# Patient Record
Sex: MALE | Race: AMERICAN INDIAN OR ALASKA NATIVE
[De-identification: names, ages, dates, MRNs, and addresses within clinical notes are randomized per-mention and may not be internally consistent; named-entity substitution may affect disease eponyms.]

---

## 2017-05-10 ENCOUNTER — HOSPITAL ENCOUNTER (OUTPATIENT)
Dept: HOSPITAL 5 - MRI | Age: 47
Discharge: HOME | End: 2017-05-10
Attending: ORTHOPAEDIC SURGERY
Payer: COMMERCIAL

## 2017-05-10 DIAGNOSIS — M47.27: ICD-10-CM

## 2017-05-10 DIAGNOSIS — M51.17: Primary | ICD-10-CM

## 2017-05-10 PROCEDURE — 72148 MRI LUMBAR SPINE W/O DYE: CPT

## 2017-05-10 NOTE — MAGNETIC RESONANCE REPORT
MRI of the lumbar spine without contrast.



History: Lumbosacral radiculopathy.



Procedure: Sagittal T1-weighted, T2-weighted, inversion recovery 

images, and axial T1 and T2-weighted images views in this study.



Findings: The L1-2, L2-3, and L3-4 levels are unremarkable.



At L4-5, there is loss of T2 signal within the discs, but no disc bulge 

or herniation is seen.



At L5-S1, there is a left posterolateral disc herniation with moderate 

effacement of the anterolateral aspect of the thecal sac and 

impingement on the left S1 nerve root. There is mild narrowing of the 

disc space.



There no significant bony abnormalities. Alignment is normal. The conus 

is normal.



Impression: Left posterolateral disc herniation at L5-S1 with 

associated compressive changes as described above.

## 2017-06-12 LAB
ALBUMIN SERPL-MCNC: 4.4 G/DL (ref 3.9–5)
ALBUMIN/GLOB SERPL: 1.5 %
ALP SERPL-CCNC: 45 UNITS/L (ref 35–129)
ALT SERPL-CCNC: 20 UNITS/L (ref 7–56)
ANION GAP SERPL CALC-SCNC: 18 MMOL/L
BASOPHILS NFR BLD AUTO: 0.3 % (ref 0–1.8)
BILIRUB SERPL-MCNC: 0.5 MG/DL (ref 0.1–1.2)
BUN SERPL-MCNC: 19 MG/DL (ref 9–20)
BUN/CREAT SERPL: 19 %
CALCIUM SERPL-MCNC: 9.9 MG/DL (ref 8.4–10.2)
CHLORIDE SERPL-SCNC: 102.5 MMOL/L (ref 98–107)
CO2 SERPL-SCNC: 24 MMOL/L (ref 22–30)
EOSINOPHIL NFR BLD AUTO: 2.5 % (ref 0–4.3)
GLUCOSE SERPL-MCNC: 121 MG/DL (ref 75–100)
HCT VFR BLD CALC: 40 % (ref 35.5–45.6)
HGB BLD-MCNC: 13.2 GM/DL (ref 11.8–15.2)
MCH RBC QN AUTO: 27 PG (ref 28–32)
MCHC RBC AUTO-ENTMCNC: 33 % (ref 32–34)
MCV RBC AUTO: 83 FL (ref 84–94)
PLATELET # BLD: 231 K/MM3 (ref 140–440)
POTASSIUM SERPL-SCNC: 4 MMOL/L (ref 3.6–5)
PROT SERPL-MCNC: 7.3 G/DL (ref 6.3–8.2)
RBC # BLD AUTO: 4.81 M/MM3 (ref 3.65–5.03)
SODIUM SERPL-SCNC: 140 MMOL/L (ref 137–145)
WBC # BLD AUTO: 4.5 K/MM3 (ref 4.5–11)

## 2017-06-12 NOTE — ANESTHESIA CONSULTATION
Anesthesia Consult and Med Hx


Date of service: 06/14/17





- Airway


Anesthetic Teeth Evaluation: Good, Chipped (top front)


ROM Head & Neck: Adequate


Mental/Hyoid Distance: Adequate


Mallampati Class: Class II


Intubation Access Assessment: Probably Good





- Pulmonary Exam


CTA: Yes





- Cardiac Exam


Cardiac Exam: RRR





- Pre-Operative Health Status


ASA Pre-Surgery Classification: ASA1, ASA2


Proposed Anesthetic Plan: General





- Pulmonary


Hx Smoking: No


Hx Sleep Apnea: No (BIANCA PRE SCREEN LOW RISK)





- Cardiovascular System


Hx Hypertension: No (elevated cholesterol)





- Central Nervous System


Hx Back Pain: Yes (TO LEFT LEG PAIN)





- Other Systems


Hx Cancer: No





- Additional Comments


Anesthesia Medical History Comments: glaucoma

## 2017-06-13 NOTE — HISTORY AND PHYSICAL REPORT
History of Present Illness


Date of examination: 06/12/17


Date of admission: 





6/14/17


Chief complaint: 


Low back pain, left leg pain.





History of present illness: 





46-year-old man with complaints of low back pain with pain radiating the left 

leg, activity related and is numbness to the dorsal lateral aspect of the left 

foot.  Treated with physical therapy program, anti-inflammatories and pain 

management.  Symptoms persisted and MR scan confirmed a herniated disc at L5-S1

, degenerative disc L4-L5 and L5-S1.  The admitted for laminectomy and 

discectomy.





Past History


Past Medical History: hyperlipidemia





Medications and Allergies


 Allergies











Allergy/AdvReac Type Severity Reaction Status Date / Time


 


Sulfa (Sulfonamide Allergy  Hives Verified 06/08/17 14:55





Antibiotics)     











 Home Medications











 Medication  Instructions  Recorded  Confirmed  Last Taken  Type


 


Rosuvastatin Calcium [Crestor] 40 mg PO QHS 06/08/17 06/08/17 Unknown History


 


traMADol [Ultram] 50 mg PO Q6HR PRN 06/08/17 06/08/17 Unknown History


 


HYDROcodone/APAP 5-325 [Nyssa 1 each PO Q6HR PRN 06/12/17 06/12/17 Unknown 

History





5/325]     











Active Meds: 


Active Medications





Famotidine (Pepcid)  20 mg PO PREOP NR


   Stop: 06/14/17 23:59


Lactated Ringer's (Lactated Ringers)  1,000 mls @ 75 mls/hr IV AS DIRECT ANA ROSA


   Stop: 06/14/17 23:59


Cefazolin Sodium (Ancef/Sterile Water 2 Gm/20 Ml)  2 gm in 20 mls @ 80 mls/hr 

IV PREOP NR


   PRN Reason: Protocol


   Stop: 06/14/17 15:00


Midazolam HCl (Versed)  2 mg IV PREOP NR


   Stop: 06/14/17 23:59











Review of Systems


All systems: negative





Exam





- Constitutional


Vitals: 


 











Temp Pulse Resp BP Pulse Ox


 


 97.5 F L  76   20   102/60    


 


 06/12/17 09:30  06/12/17 09:30  06/12/17 09:30  06/12/17 09:30   











General appearance: Present: no acute distress, well-nourished





- EENT


Eyes: Present: PERRL


ENT: hearing intact, clear oral mucosa





- Neck


Neck: Present: supple, normal ROM





- Respiratory


Respiratory effort: normal


Respiratory: bilateral: CTA





- Cardiovascular


Heart Sounds: Present: S1 & S2.  Absent: rub, click





- Extremities


Extremities: pulses symmetrical, No edema, abnormal (Lumbar spine with reversal 

lordosis, moderate generalized tenderness, no deformity.  Forward flexion to 

knee, extension 20 rotations bending 20.  Straight-leg raising positive the 

left at 6070 degrees right 80.  Angela test negative, numbness along the left 

posterolateral calf extending into the foot.  Weakness left their on a and EHL, 

and the tibial grade 5, symmetrical bilateral.  Reflexes diminished left ankle 

otherwise within normal limits.  Tone and coordination unremarkable, pulse 

palpated unremarkable.)


Peripheral Pulses: within normal limits





- Abdominal


General gastrointestinal: Present: soft, non-tender, non-distended, normal 

bowel sounds


Male genitourinary: Present: normal





- Integumentary


Integumentary: Present: clear, warm, dry





- Musculoskeletal


Musculoskeletal: gait normal, strength equal bilaterally





- Psychiatric


Psychiatric: appropriate mood/affect, intact judgment & insight





- Neurologic


Neurologic: CNII-XII intact, moves all extremities





Results





- Labs


CBC & Chem 7: 


 06/12/17 09:30





 06/12/17 09:30





Assessment and Plan





- Patient Problems


(1) Lumbar disc herniation with radiculopathy


Status: Acute   


Plan to address problem: 


Laminectomy, discectomy L5-S1,?  L4-L5.  Procedure, complications and Lachman 

discussed with.

## 2017-06-14 ENCOUNTER — HOSPITAL ENCOUNTER (OUTPATIENT)
Dept: HOSPITAL 5 - OR | Age: 47
Setting detail: OBSERVATION
LOS: 1 days | Discharge: HOME | End: 2017-06-15
Attending: ORTHOPAEDIC SURGERY | Admitting: ORTHOPAEDIC SURGERY
Payer: COMMERCIAL

## 2017-06-14 VITALS — DIASTOLIC BLOOD PRESSURE: 53 MMHG | SYSTOLIC BLOOD PRESSURE: 100 MMHG

## 2017-06-14 DIAGNOSIS — E78.5: ICD-10-CM

## 2017-06-14 DIAGNOSIS — M51.16: Primary | ICD-10-CM

## 2017-06-14 PROCEDURE — A4649 SURGICAL SUPPLIES: HCPCS

## 2017-06-14 PROCEDURE — 97161 PT EVAL LOW COMPLEX 20 MIN: CPT

## 2017-06-14 PROCEDURE — 86900 BLOOD TYPING SEROLOGIC ABO: CPT

## 2017-06-14 PROCEDURE — 88304 TISSUE EXAM BY PATHOLOGIST: CPT

## 2017-06-14 PROCEDURE — 96366 THER/PROPH/DIAG IV INF ADDON: CPT

## 2017-06-14 PROCEDURE — 63030 LAMOT DCMPRN NRV RT 1 LMBR: CPT

## 2017-06-14 PROCEDURE — 80053 COMPREHEN METABOLIC PANEL: CPT

## 2017-06-14 PROCEDURE — 85025 COMPLETE CBC W/AUTO DIFF WBC: CPT

## 2017-06-14 PROCEDURE — G0378 HOSPITAL OBSERVATION PER HR: HCPCS

## 2017-06-14 PROCEDURE — 36415 COLL VENOUS BLD VENIPUNCTURE: CPT

## 2017-06-14 PROCEDURE — 86901 BLOOD TYPING SEROLOGIC RH(D): CPT

## 2017-06-14 PROCEDURE — 96365 THER/PROPH/DIAG IV INF INIT: CPT

## 2017-06-14 PROCEDURE — 72020 X-RAY EXAM OF SPINE 1 VIEW: CPT

## 2017-06-14 PROCEDURE — 86850 RBC ANTIBODY SCREEN: CPT

## 2017-06-14 RX ADMIN — CEFAZOLIN SCH MLS/HR: 330 INJECTION, POWDER, FOR SOLUTION INTRAMUSCULAR; INTRAVENOUS at 18:07

## 2017-06-14 NOTE — PROCEDURE NOTE
Date of procedure: 06/14/17


Pre-op diagnosis: Herniated disc L5-S1


Post-op diagnosis: same


Procedure: 





Laminectomy/ discetomy L5-s1


Anesthesia: GETA


Surgeon: ROSSI LORENZ


Estimated blood loss: minimal


Pathology: list (disc)


Specimen disposition: to lab


Condition: stable


Disposition: PACU

## 2017-06-14 NOTE — OPERATIVE REPORT
PREOPERATIVE DIAGNOSIS:  Herniated disk at L5-S1 with left radiculopathy.



POSTOPERATIVE DIAGNOSIS:  Herniated disk at L5-S1 with left radiculopathy.



OPERATIVE PROCEDURE:  Lumbar laminectomy with diskectomy, L5-S1.



SURGEON:  Katheryn Luther MD



ASSISTANT:  Cleopatra Flores CSA.



ANESTHESIA:  General.



BLOOD LOSS:  Less than 50 mL.



PROCEDURE IN DETAIL:  The patient was taken to surgery suite, satisfactory

analgesia obtained with general anesthetic.  He was positioned using the Frederic

frame.  Bony prominences satisfactorily padded.  Lumbar region prepped with

ChloraPrep, satisfactorily draped in the standard fashion.



The correct patient, procedure and sites were confirmed.



Midline incision was then made centering over the spinous process of L5 through

S2.  Incision was deepened through thickness of skin and subcutaneous. 

Thoracolumbar fascia was incised and is elevated subperiosteally over the left

where the patient is symptomatic.  Fluoroscopy was carried out and the L5-S1

space was confirmed.



The ligament of flavum structures then elevated from the posterior arch of L5. 

Cotton pad_ was inserted to protect thedural structures and the laminotomy was 
then

completed.  The L5-S1 nerve root was identified which was retracted to the

midline, lateral recess was packed with cottonoids.  Epidural veins were

cauterized and the disk was visualized, which appeared protruding into the

thecal sac.  Incision was made into the posterior longitudinal ligament and the

disk space was then tied.  Disk was then evacuated using pituitary forceps, disk

space gently corrected with the ring curette to remove any loose fragments and

decompression was completed.  Decompression appeared adequate.  Wound was then

irrigated.  Neural foramina was explored and no retained fragments were

identified.



The laminotomy defect was covered with Gelfoam and 40 mg of Decadron was

administered into the epidural space.  Wound was then closed in layers in the

standard fashion using 0 Vicryl, 2-0 Vicryl and the standard skin closure. 

Paravertebral musculatures and thoracolumbar fascia was then infiltrated with 20

mL of 0.25% Marcaine plain and 30 mg of Toradol and 4 mg of morphine proposed

postop pain control.  Sterile dressings placed.  The patient was transferred to

recovery room having tolerated the procedure well and at the completion of

procedure, counts were accurate.



The patient will be discharged when his antibiotics doses are completed.



HOMEGOING INSTRUCTIONS:  Sedentary activity level.  Wound care.  Follow up at

the office in 7 to 10 days.





DD: 06/14/2017 11:19

DT: 06/14/2017 11:58

JOB# [id number]  [id number]

VENANCIO/ARTEM MAYS

## 2017-06-14 NOTE — DISCHARGE SUMMARY
Providers





- Providers


Date of Admission: 





6/14/17


Date of discharge: 06/15/17


Attending physician: 


ROSSI LORENZ





Primary care physician: 


TANNER WATT








Hospitalization


Reason for admission: Herniated disc L5-S1


Condition: Stable


Procedures: 





lumbar laminectomy, discectomy L5-S1 left


Hospital course: 





uneventful, no complications


Disposition: MO-01 TO HOME OR SELFCARE





- Discharge Diagnoses


(1) Lumbar disc herniation with radiculopathy


Status: Acute   





Core Measure Documentation





- Palliative Care


Palliative Care/ Comfort Measures: Not Applicable





- Core Measures


Any of the following diagnoses?: none





Exam





- Constitutional


Vitals: 


 











Temp Pulse Resp BP Pulse Ox


 


 98.3 F   60   16   109/69   100 


 


 06/14/17 09:15  06/14/17 09:15  06/14/17 09:15  06/14/17 09:15  06/14/17 09:15














Plan


Activity: no driving until cleared by PCP, other


Weight Bearing Status: Full Weight Bearing


Diet: regular


Wound: per your surgeon's advice


Follow up with: 


TANNER WATT MD [Primary Care Provider] - 7 Days


ROSSI LORENZ MD [Staff Physician] - 7 Days

## 2017-06-15 RX ADMIN — CEFAZOLIN SCH MLS/HR: 330 INJECTION, POWDER, FOR SOLUTION INTRAMUSCULAR; INTRAVENOUS at 01:27

## 2017-06-15 NOTE — XRAY REPORT
X-RAY LUMBAR SPINE THREE VIEWS: 06/14/17



CLINICAL: Fluoroscopic images at time of lumbar laminectomy.



FINDINGS: A lateral  is centered at L5-S1.  Metal instruments 

marked level on a subsequent image.

## 2017-06-15 NOTE — PROGRESS NOTE
Assessment and Plan


Alert orientated in nad 


OOB neuro intact


pain ok





DC today








Subjective


Date of service: 06/15/17





Objective


Vital signs: 


 Vital Signs - 12hr











  06/14/17





  23:50


 


Temperature 97.6 F


 


Pulse Rate [ 50 L





From Monitor] 


 


Respiratory 16





Rate 


 


Blood Pressure 100/53





[Left Arm] 


 


O2 Sat by Pulse 99





Oximetry 














- Labs


CBC & BMP: 


 06/12/17 09:30





 06/12/17 09:30

## 2020-02-05 ENCOUNTER — HOSPITAL ENCOUNTER (OUTPATIENT)
Dept: HOSPITAL 5 - XRAY | Age: 50
Discharge: HOME | End: 2020-02-05
Attending: INTERNAL MEDICINE
Payer: COMMERCIAL

## 2020-02-05 DIAGNOSIS — Z00.00: Primary | ICD-10-CM

## 2020-02-05 DIAGNOSIS — M25.561: ICD-10-CM

## 2020-02-05 DIAGNOSIS — M51.37: ICD-10-CM

## 2020-02-05 DIAGNOSIS — M25.562: ICD-10-CM

## 2020-02-05 LAB
ALBUMIN SERPL-MCNC: 4.8 G/DL (ref 3.9–5)
ALT SERPL-CCNC: 17 UNITS/L (ref 7–56)
BUN SERPL-MCNC: 17 MG/DL (ref 9–20)
BUN/CREAT SERPL: 19 %
CALCIUM SERPL-MCNC: 10.2 MG/DL (ref 8.4–10.2)
HCT VFR BLD CALC: 43.8 % (ref 35.5–45.6)
HDLC SERPL-MCNC: 76 MG/DL (ref 40–59)
HEMOLYSIS INDEX: 2
HGB BLD-MCNC: 14.7 GM/DL (ref 11.8–15.2)
MCHC RBC AUTO-ENTMCNC: 34 % (ref 32–34)
MCV RBC AUTO: 83 FL (ref 84–94)
PLATELET # BLD: 237 K/MM3 (ref 140–440)
RBC # BLD AUTO: 5.27 M/MM3 (ref 3.65–5.03)

## 2020-02-05 PROCEDURE — 85027 COMPLETE CBC AUTOMATED: CPT

## 2020-02-05 PROCEDURE — 83036 HEMOGLOBIN GLYCOSYLATED A1C: CPT

## 2020-02-05 PROCEDURE — 84153 ASSAY OF PSA TOTAL: CPT

## 2020-02-05 PROCEDURE — 71046 X-RAY EXAM CHEST 2 VIEWS: CPT

## 2020-02-05 PROCEDURE — 80061 LIPID PANEL: CPT

## 2020-02-05 PROCEDURE — 80053 COMPREHEN METABOLIC PANEL: CPT

## 2020-02-05 PROCEDURE — 82306 VITAMIN D 25 HYDROXY: CPT

## 2020-02-05 PROCEDURE — 36415 COLL VENOUS BLD VENIPUNCTURE: CPT

## 2020-02-05 PROCEDURE — 72110 X-RAY EXAM L-2 SPINE 4/>VWS: CPT

## 2020-02-05 PROCEDURE — 85652 RBC SED RATE AUTOMATED: CPT

## 2020-02-05 NOTE — XRAY REPORT
CHEST 2 VIEWS 



INDICATION:  Routine physical.



COMPARISON:  None available



FINDINGS:

Support devices: None.



Heart: Within normal limits. 

Lungs/pleura: No acute air space or interstitial disease.  No pneumothorax.



Additional findings: None.



IMPRESSION:

Normal chest x-ray



LUMBOSACRAL SPINE 5 VIEWS



INDICATION:  DUE TO BACK PAIN.



COMPARISON: None.



IMPRESSION:  Normal alignment.  Mild discogenic DJD is identified at L3-4. Moderate discogenic DJD is
 identified at L5-S1. The facet joints are unremarkable. No significant neural foraminal narrowing is
 noted on the oblique images.  No acute osseous or soft tissue abnormality.  



BILATERAL KNEES 3 VIEWS



INDICATION:  Bilateral knee pain. 



COMPARISON: None.



IMPRESSION:  No acute osseous or soft tissue abnormality.    No significant DJD.



Signer Name: Ezequiel Almaraz Jr, MD 

Signed: 2/5/2020 8:57 AM

 Workstation Name: [id number]

## 2020-02-05 NOTE — XRAY REPORT
CHEST 2 VIEWS 



INDICATION:  Routine physical.



COMPARISON:  None available



FINDINGS:

Support devices: None.



Heart: Within normal limits. 

Lungs/pleura: No acute air space or interstitial disease.  No pneumothorax.



Additional findings: None.



IMPRESSION:

Normal chest x-ray



LUMBOSACRAL SPINE 5 VIEWS



INDICATION:  DUE TO BACK PAIN.



COMPARISON: None.



IMPRESSION:  Normal alignment.  Mild discogenic DJD is identified at L3-4. Moderate discogenic DJD is
 identified at L5-S1. The facet joints are unremarkable. No significant neural foraminal narrowing is
 noted on the oblique images.  No acute osseous or soft tissue abnormality.  



BILATERAL KNEES 3 VIEWS



INDICATION:  Bilateral knee pain. 



COMPARISON: None.



IMPRESSION:  No acute osseous or soft tissue abnormality.    No significant DJD.



Signer Name: Ezequiel Almaraz Jr, MD 

Signed: 2/5/2020 8:57 AM

 Workstation Name: [id number]

## 2020-02-05 NOTE — XRAY REPORT
CHEST 2 VIEWS 



INDICATION:  Routine physical.



COMPARISON:  None available



FINDINGS:

Support devices: None.



Heart: Within normal limits. 

Lungs/pleura: No acute air space or interstitial disease.  No pneumothorax.



Additional findings: None.



IMPRESSION:

Normal chest x-ray



LUMBOSACRAL SPINE 5 VIEWS



INDICATION:  DUE TO BACK PAIN.



COMPARISON: None.



IMPRESSION:  Normal alignment.  Mild discogenic DJD is identified at L3-4. Moderate discogenic DJD is
 identified at L5-S1. The facet joints are unremarkable. No significant neural foraminal narrowing is
 noted on the oblique images.  No acute osseous or soft tissue abnormality.  



BILATERAL KNEES 3 VIEWS



INDICATION:  Bilateral knee pain. 



COMPARISON: None.



IMPRESSION:  No acute osseous or soft tissue abnormality.    No significant DJD.



Signer Name: Ezequiel Almaraz Jr, MD 

Signed: 2/5/2020 8:57 AM

 Workstation Name: [id number]

## 2020-02-08 LAB — VITAMIN D2 SERPL-MCNC: <4 NG/ML

## 2020-11-02 ENCOUNTER — HOSPITAL ENCOUNTER (OUTPATIENT)
Dept: HOSPITAL 5 - MRI | Age: 50
Discharge: HOME | End: 2020-11-02
Attending: INTERNAL MEDICINE
Payer: COMMERCIAL

## 2020-11-02 DIAGNOSIS — G44.52: Primary | ICD-10-CM

## 2020-11-02 DIAGNOSIS — M79.644: ICD-10-CM

## 2020-11-02 DIAGNOSIS — R11.0: ICD-10-CM

## 2020-11-02 DIAGNOSIS — M79.645: ICD-10-CM

## 2020-11-02 PROCEDURE — 70553 MRI BRAIN STEM W/O & W/DYE: CPT

## 2020-11-02 PROCEDURE — A9577 INJ MULTIHANCE: HCPCS

## 2020-11-02 PROCEDURE — 73140 X-RAY EXAM OF FINGER(S): CPT

## 2020-11-02 NOTE — MAGNETIC RESONANCE REPORT
MRI BRAIN WITHOUT AND WITH CONTRAST



INDICATION / CLINICAL INFORMATION:

PERSISTENT HEADACHES, WITH NAUSEA.



TECHNIQUE: 

Multiplanar, multisequence MR images of the brain were obtained. The patient received 15 mL IV ProHan
ce.



COMPARISON: 

None available.



FINDINGS:



BRAIN / INTRACRANIAL CONTENTS: No acute ischemia, acute hemorrhage, mass effect, midline shift, or hy
drocephalus.  No chronic infarct or significant atrophy. No significant demyelinating changes. No abn
ormal enhancement.



CRANIOCERVICAL JUNCTION: No significant abnormality.

VASCULAR FLOW-VOIDS: No significant abnormality.



ORBITS: No significant abnormality of visualized orbits.

SINUSES / MASTOIDS: No significant abnormality of visualized sinuses and mastoid air cells.



ADDITIONAL FINDINGS: None. 



IMPRESSION:

1. No acute or concerning findings. No findings to explain the patient's symptoms. 



Signer Name: Stanton Ahuja MD 

Signed: 11/2/2020 1:06 PM

Workstation Name: [id number]

## 2020-11-02 NOTE — XRAY REPORT
XR finger(s) 2+V BILAT



INDICATION / CLINICAL INFORMATION:

PAIN IN FINGER.



COMPARISON: 

None available.



FINDINGS:



No acute fracture.  Normal alignment.  Joint spaces are preserved. No destructive osseous lesion or s
uspicious periosteal reaction.



Impression:

1.No significant osseous abnormality.



Signer Name: Jordan Miguel MD 

Signed: 11/2/2020 3:14 PM

Workstation Name: [email or web address]

## 2021-03-24 ENCOUNTER — HOSPITAL ENCOUNTER (OUTPATIENT)
Dept: HOSPITAL 5 - XRAY | Age: 51
Discharge: HOME | End: 2021-03-24
Attending: INTERNAL MEDICINE
Payer: COMMERCIAL

## 2021-03-24 ENCOUNTER — HOSPITAL ENCOUNTER (OUTPATIENT)
Dept: HOSPITAL 5 - COVVAC | Age: 51
Discharge: HOME | End: 2021-03-24
Attending: INTERNAL MEDICINE
Payer: COMMERCIAL

## 2021-03-24 DIAGNOSIS — R05: Primary | ICD-10-CM

## 2021-03-24 DIAGNOSIS — Z23: Primary | ICD-10-CM

## 2021-03-24 LAB
ALBUMIN SERPL-MCNC: 4.4 G/DL (ref 3.9–5)
ALT SERPL-CCNC: 18 UNITS/L (ref 7–56)
BASOPHILS # (AUTO): 0 K/MM3 (ref 0–0.1)
BASOPHILS NFR BLD AUTO: 0.3 % (ref 0–1.8)
BUN SERPL-MCNC: 17 MG/DL (ref 9–20)
BUN/CREAT SERPL: 17 %
CALCIUM SERPL-MCNC: 10.1 MG/DL (ref 8.4–10.2)
EOSINOPHIL # BLD AUTO: 0.1 K/MM3 (ref 0–0.4)
EOSINOPHIL NFR BLD AUTO: 2 % (ref 0–4.3)
HCT VFR BLD CALC: 42.7 % (ref 35.5–45.6)
HDLC SERPL-MCNC: 59 MG/DL (ref 40–59)
HEMOLYSIS INDEX: 7
HGB BLD-MCNC: 14.4 GM/DL (ref 11.8–15.2)
LYMPHOCYTES # BLD AUTO: 1.8 K/MM3 (ref 1.2–5.4)
LYMPHOCYTES NFR BLD AUTO: 41.4 % (ref 13.4–35)
MCHC RBC AUTO-ENTMCNC: 34 % (ref 32–34)
MCV RBC AUTO: 84 FL (ref 84–94)
MONOCYTES # (AUTO): 0.4 K/MM3 (ref 0–0.8)
MONOCYTES % (AUTO): 8.4 % (ref 0–7.3)
PLATELET # BLD: 230 K/MM3 (ref 140–440)
RBC # BLD AUTO: 5.06 M/MM3 (ref 3.65–5.03)

## 2021-03-24 PROCEDURE — 0001A: CPT

## 2021-03-24 PROCEDURE — 83036 HEMOGLOBIN GLYCOSYLATED A1C: CPT

## 2021-03-24 PROCEDURE — 84153 ASSAY OF PSA TOTAL: CPT

## 2021-03-24 PROCEDURE — 85025 COMPLETE CBC W/AUTO DIFF WBC: CPT

## 2021-03-24 PROCEDURE — 36415 COLL VENOUS BLD VENIPUNCTURE: CPT

## 2021-03-24 PROCEDURE — 80053 COMPREHEN METABOLIC PANEL: CPT

## 2021-03-24 PROCEDURE — 82306 VITAMIN D 25 HYDROXY: CPT

## 2021-03-24 PROCEDURE — 84145 PROCALCITONIN (PCT): CPT

## 2021-03-24 PROCEDURE — 80061 LIPID PANEL: CPT

## 2021-03-24 PROCEDURE — 71046 X-RAY EXAM CHEST 2 VIEWS: CPT

## 2021-03-24 NOTE — XRAY REPORT
CHEST 2 VIEWS 



INDICATION:  COUGH.



COMPARISON:  2/5/2020



FINDINGS:

Support devices: None.



Heart: Within normal limits. 

Lungs/pleura: No acute air space or interstitial disease.  No pneumothorax.



Additional findings: None.



IMPRESSION:

No acute findings.



Signer Name: Ezequiel Almaraz Jr, MD 

Signed: 3/24/2021 10:45 AM

Workstation Name: [id number]

## 2022-01-01 NOTE — ADMIT CRITERIA FORM
Admission Criteria Documentation: 





            AMBULATORY SURGERY  EXCEPTION CRITERIA 





Ambulatory Surgery Exception Criteria


                                                                               (

Place 'X' for any and all applicable criteria): 





Surgery or procedure performed on ambulatory basis may require inpatient stay 

for[A] ANY ONE of the following(1)(2)(3)(4)(5)(6)(7)(8)(9):


[X] I.   A preoperative situation, condition, or finding that warrants 

inpatient stay as indicated by ANY  ONE of the following:


   [X]   a)   Inpatient care needed because of severity of a disease or 

condition rather than the surgery (eg, 


              severe cardiac or respiratory disease, severe infection) (15) (16

) (17) (18)


   []   b)   Emergent procedure (eg, angioplasty for acute ischemia)(19)


   []   c)   Complex surgical approach or situation as indicated by ANY  ONE of 

the following(3):


        []   i)    Open approach needed instead of usual endoscopic, 

transcatheter, or other less invasive procedure


        []   ii)   Difficult approach because of previous operation


        []   iii)  Airway monitoring required after open neck procedures(20)(21)


        []   iv)  Large mass requiring unusually extensive dissection


        []   v)  Additional complicating feature requiring inpatient care (eg, 

drain management)(22(23):


   []   d)   Major surgery in a pt with high anesthetic risk as indicated by 

ANY  ONE of the following (2)(3)(5)(7)(8):


        []   i)     ASA risk class III or higher (severe systemic disease 

impairing function) [D]


        []   ii)    Advanced age (eg, older than 85 years)(14)(24)             

                   


        []   iii)   Symptomatic heart failure(25)


        []   iv)   Symptomatic asthma or COPD(8)(21)


        []    v)   Morbid obesity with hemodynamic or respiratory problems(20)(

21)(26)(27)


        []   vi)   Obstructive sleep apnea(20)(21)


        []   vii)   Former premature infants who are younger than 60 weeks


        []   viii)  High risk for severe postoperative abnormalities (eg, 

severe postoperative hypocalcemia 


                    after parathyroidectomy for severe hyperparathyroidism)(27)(

28)


        []   ix)   Unstable angina(25) 


   []   e)  Drug-related risk requiring inpatient stay as indicated by ANY  ONE

  of the following(5)(10)(14)(32)(33)


        []   i)     Procedure requires discontinuing drugs or other therapy (eg

, antiarrhythmic    medication,   


                    antiseizure medication), which necessitates inpatient 

observation or treatment.(18)(31)


        []   ii)    Major surgery and high risk drug use as indicated by ANY  

ONE   of the following:


             []     1)    Active abuse of cocaine or similar drug      


             []     2)    Monoamine oxidase inhibitor use


             []     3)    Other drug identified as posing risk


   []   f)   Inadequate outpatient care situation as indicated by ANY  ONE  of 

the following(5)(10)(14)(32)(33)


        []   i)   Patient lives remote from medical facility and procedure has 

urgent complication potential, 


                  and temporary nearby residence cannot be arranged


        []  ii)   Patient will have postprocedure incapacitation and inadequate 

assistance at home, or alternative level of care cannot be arranged.


        []  iii)   Patient will have long general anesthesia or procedure side 

effect resolution time, and 


                  competent person to stay with patient on first postoperative 

night at home or alternative  level of care cannot be arranged.


        []iv)    Other inadequate outpatient situation that cannot be handled 

by other means


[] II.  A perioperative event, condition, or finding that warrants inpatient 

stay as indicated by ANY  ONE of the following (1)(2)(3):        


   []   a)   Inadequate physiologic recovery: cardiovascular,  respiratory, or 

hemodynamic status not normal or near preoperative baseline(18)                

                                                                               

          


   []   b)   Hemodynamic instability


   []   c)   Patient not alert with near normal or baseline mental status


   []   d)   Temperature not normal or as expected and not appropriate for 

outpatient treatment of condition 


   []   e)   Ambulatory or appropriate activity level status not yet achieved 

post procedure [E](34)(35)(36)


   []   f)    Operative site not appropriate (eg, unexpected or excessive 

drainage or bleeding)


   []   g)   Postoperative effects not resolved or adequately managed (eg, 

significant pain or vomiting not 


              appropriate for outpatient or next level of care)(10)(12)


   []   h)   Complicating features requiring inpatient care as indicated by ANY

  ONE  of the following(37):


        []   i)    Severe complications of procedure (eg, bowel injury, airway 

compromise, vascular injury,severe hemorrhage)


        []   ii)   Extensive (eg, dissection far beyond usual scope of procedure

) or prolonged (eg, 120 minutes  


                   beyond usual) surgery needed requiring inpatient 

postoperative care


        []   iii)  Conversion to an open or complex procedure that requires 

inpatient care (eg, open vs 


                   laparoscopic cholecystectomy, abdominal vs vaginal 

hysterectomy)(38)


        []   iv)  Comorbid condition or test result identified during or post 

procedure that requires inpatient care (7)


        []   v)   Malignant hyperthermia(30)


        []   vi)  Other complicating feature requiring inpatient care(22)(23)











Inpatient stay may be needed until ALL of the following are present (1)(2)(3)(4)

(5)(6)(10)(14)(33)(40):


[]a)   Physiologic recovery: cardiovascular, respiratory, and hemodynamic 

status normal or near preoperative baseline


[]b)   Hemodynamic stability    


[]c)   Patient alert, with near normal or baseline mental status


[]d)   Temperature appropriate: patient afebrile or temperature appropriate for 

outpt treatment of condition 


[]e)   Activity level appropriate: ambulatory or appropriate activity level 

post procedure


[]f)   Operative site appropriate as indicated by ALL of the following:


       []i)    Site dry or with expected drainage 


       []ii)   Any blood noted is as expected for procedure.


[]g)  Postoperative effects resolved or managed as indicated by ALL of the 

following:


       []i)    Pain management appropriate for outpatient (or next level of) 

care(10)


       []ii)   Minimal nausea and vomiting: if present, successfully treated 

with oral medication(12)


       []iii)   Headache, dizziness, or drowsiness (if present) are mild.


[]h)  Voiding status acceptable as indicated by ANY  ONE  of the following:


       []i)    Voiding spontaneously    


       []ii)   No voiding but instructions given for follow-up in 6 to 8 hours 


       []iii)  Urinary catheter in place, and instructions given for follow-up


[]i)   Complicating features requiring inpatient care manageable at a lower 

level of care(37)


[]j)   Comorbid conditions manageable at a lower level of care(37)











The original [employer] content created by [employer] has been revised. 


The portions of the content which have been revised are identified through the 

use of italic text or in bold, and [employer] 


has neither reviewed nor approved the modified material. All other unmodified 

content is copyright  [employer].





Please see references footnoted in the original [employer] edition 

2016                                                                    


   


Admission Criteria Met: Yes
negative